# Patient Record
Sex: FEMALE | Race: WHITE | HISPANIC OR LATINO | Employment: OTHER | URBAN - METROPOLITAN AREA
[De-identification: names, ages, dates, MRNs, and addresses within clinical notes are randomized per-mention and may not be internally consistent; named-entity substitution may affect disease eponyms.]

---

## 2017-09-03 ENCOUNTER — HOSPITAL ENCOUNTER (EMERGENCY)
Facility: HOSPITAL | Age: 37
Discharge: HOME | End: 2017-09-03
Attending: EMERGENCY MEDICINE | Admitting: EMERGENCY MEDICINE

## 2017-09-03 VITALS
OXYGEN SATURATION: 100 % | HEIGHT: 64 IN | BODY MASS INDEX: 39.27 KG/M2 | SYSTOLIC BLOOD PRESSURE: 132 MMHG | TEMPERATURE: 97.7 F | RESPIRATION RATE: 18 BRPM | WEIGHT: 230 LBS | DIASTOLIC BLOOD PRESSURE: 76 MMHG | HEART RATE: 77 BPM

## 2017-09-03 DIAGNOSIS — T23.121A: Primary | ICD-10-CM

## 2017-09-03 DIAGNOSIS — T23.221A: ICD-10-CM

## 2017-09-03 PROCEDURE — 99283 EMERGENCY DEPT VISIT LOW MDM: CPT

## 2017-09-03 RX ORDER — BACITRACIN, NEOMYCIN, POLYMYXIN B 400; 3.5; 5 [USP'U]/G; MG/G; [USP'U]/G
1 OINTMENT TOPICAL ONCE
Status: COMPLETED | OUTPATIENT
Start: 2017-09-03 | End: 2017-09-03

## 2017-09-03 RX ADMIN — BACITRACIN, NEOMYCIN, POLYMYXIN B 1 SMALL APPLICATION: 400; 3.5; 5 OINTMENT TOPICAL at 00:54

## 2020-07-04 ENCOUNTER — HOSPITAL ENCOUNTER (EMERGENCY)
Facility: HOSPITAL | Age: 40
Discharge: HOME | End: 2020-07-04
Attending: EMERGENCY MEDICINE

## 2020-07-04 VITALS
BODY MASS INDEX: 44.65 KG/M2 | OXYGEN SATURATION: 99 % | HEART RATE: 93 BPM | SYSTOLIC BLOOD PRESSURE: 130 MMHG | WEIGHT: 260.14 LBS | RESPIRATION RATE: 19 BRPM | DIASTOLIC BLOOD PRESSURE: 69 MMHG | TEMPERATURE: 98.2 F

## 2020-07-04 DIAGNOSIS — N81.4 CERVICAL PROLAPSE: Primary | ICD-10-CM

## 2020-07-04 DIAGNOSIS — D50.9 MICROCYTIC ANEMIA: ICD-10-CM

## 2020-07-04 LAB
ANION GAP SERPL CALCULATED.3IONS-SCNC: 8 MMOL/L (ref 4–13)
BACTERIA UR QL AUTO: ABNORMAL /HPF
BASOPHILS # BLD AUTO: 0.02 THOUSANDS/ΜL (ref 0–0.1)
BASOPHILS NFR BLD AUTO: 0 % (ref 0–1)
BILIRUB UR QL STRIP: NEGATIVE
BUN SERPL-MCNC: 11 MG/DL (ref 5–25)
CALCIUM SERPL-MCNC: 8.7 MG/DL (ref 8.3–10.1)
CHLORIDE SERPL-SCNC: 104 MMOL/L (ref 100–108)
CLARITY UR: CLEAR
CO2 SERPL-SCNC: 28 MMOL/L (ref 21–32)
COLOR UR: YELLOW
CREAT SERPL-MCNC: 0.69 MG/DL (ref 0.6–1.3)
EOSINOPHIL # BLD AUTO: 0.16 THOUSAND/ΜL (ref 0–0.61)
EOSINOPHIL NFR BLD AUTO: 3 % (ref 0–6)
ERYTHROCYTE [DISTWIDTH] IN BLOOD BY AUTOMATED COUNT: 20.3 % (ref 11.6–15.1)
EXT PREG TEST URINE: NEGATIVE
EXT. CONTROL ED NAV: NORMAL
GFR SERPL CREATININE-BSD FRML MDRD: 108 ML/MIN/1.73SQ M
GLUCOSE SERPL-MCNC: 85 MG/DL (ref 65–140)
GLUCOSE UR STRIP-MCNC: NEGATIVE MG/DL
HCT VFR BLD AUTO: 36.6 % (ref 34.8–46.1)
HGB BLD-MCNC: 10.5 G/DL (ref 11.5–15.4)
HGB UR QL STRIP.AUTO: ABNORMAL
IMM GRANULOCYTES # BLD AUTO: 0.02 THOUSAND/UL (ref 0–0.2)
IMM GRANULOCYTES NFR BLD AUTO: 0 % (ref 0–2)
KETONES UR STRIP-MCNC: NEGATIVE MG/DL
LEUKOCYTE ESTERASE UR QL STRIP: NEGATIVE
LYMPHOCYTES # BLD AUTO: 1.84 THOUSANDS/ΜL (ref 0.6–4.47)
LYMPHOCYTES NFR BLD AUTO: 28 % (ref 14–44)
MCH RBC QN AUTO: 20.5 PG (ref 26.8–34.3)
MCHC RBC AUTO-ENTMCNC: 28.7 G/DL (ref 31.4–37.4)
MCV RBC AUTO: 71 FL (ref 82–98)
MONOCYTES # BLD AUTO: 0.42 THOUSAND/ΜL (ref 0.17–1.22)
MONOCYTES NFR BLD AUTO: 7 % (ref 4–12)
MUCOUS THREADS UR QL AUTO: ABNORMAL
NEUTROPHILS # BLD AUTO: 4.03 THOUSANDS/ΜL (ref 1.85–7.62)
NEUTS SEG NFR BLD AUTO: 62 % (ref 43–75)
NITRITE UR QL STRIP: NEGATIVE
NON-SQ EPI CELLS URNS QL MICRO: ABNORMAL /HPF
NRBC BLD AUTO-RTO: 0 /100 WBCS
PH UR STRIP.AUTO: 6 [PH]
PLATELET # BLD AUTO: 301 THOUSANDS/UL (ref 149–390)
PMV BLD AUTO: 10 FL (ref 8.9–12.7)
POTASSIUM SERPL-SCNC: 3.7 MMOL/L (ref 3.5–5.3)
PROT UR STRIP-MCNC: NEGATIVE MG/DL
RBC # BLD AUTO: 5.13 MILLION/UL (ref 3.81–5.12)
RBC #/AREA URNS AUTO: ABNORMAL /HPF
SODIUM SERPL-SCNC: 140 MMOL/L (ref 136–145)
SP GR UR STRIP.AUTO: >=1.03 (ref 1–1.03)
TSH SERPL DL<=0.05 MIU/L-ACNC: 1.31 UIU/ML (ref 0.36–3.74)
UROBILINOGEN UR QL STRIP.AUTO: 0.2 E.U./DL
WBC # BLD AUTO: 6.49 THOUSAND/UL (ref 4.31–10.16)
WBC #/AREA URNS AUTO: ABNORMAL /HPF

## 2020-07-04 PROCEDURE — 99283 EMERGENCY DEPT VISIT LOW MDM: CPT | Performed by: EMERGENCY MEDICINE

## 2020-07-04 PROCEDURE — 36415 COLL VENOUS BLD VENIPUNCTURE: CPT | Performed by: EMERGENCY MEDICINE

## 2020-07-04 PROCEDURE — 99284 EMERGENCY DEPT VISIT MOD MDM: CPT

## 2020-07-04 PROCEDURE — 81001 URINALYSIS AUTO W/SCOPE: CPT | Performed by: EMERGENCY MEDICINE

## 2020-07-04 PROCEDURE — 80048 BASIC METABOLIC PNL TOTAL CA: CPT | Performed by: EMERGENCY MEDICINE

## 2020-07-04 PROCEDURE — 84443 ASSAY THYROID STIM HORMONE: CPT | Performed by: EMERGENCY MEDICINE

## 2020-07-04 PROCEDURE — 81025 URINE PREGNANCY TEST: CPT | Performed by: EMERGENCY MEDICINE

## 2020-07-04 PROCEDURE — 85025 COMPLETE CBC W/AUTO DIFF WBC: CPT | Performed by: EMERGENCY MEDICINE

## 2020-07-04 NOTE — ED PROVIDER NOTES
History  Chief Complaint   Patient presents with    Vaginal Prolapse     pt co of a "bulge in her vigina, i noticed it when i was trying to put in the cup for my period"      HPI     39-year-old previously healthy female,  with 2 teenaged children born via spontaneous vaginal delivery, who presents for evaluation of a bulge in her vaginal area  Patient states that she has always had regular periods, but over the last year her periods have become lighter and more irregular  She had some light bleeding a week ago for 2 days, the bleeding stopped, and then recurred yesterday  She uses a menstrual cup, but noticed a bulge in her vaginal area that made it difficult to insert the cup yesterday  She denies any prior history of this  Does endorse occasional stress incontinence with sneezing, but no other change in bowel or bladder habits  No dysuria or frequency  She has not seen an OBGYN or had routine Pap smears in 14 years since the birth of her son  No history of OB gyn pathology  No vaginal discharge  Denies painful intercourse  She is not currently on any birth control  None       History reviewed  No pertinent past medical history  History reviewed  No pertinent surgical history  History reviewed  No pertinent family history  I have reviewed and agree with the history as documented  E-Cigarette/Vaping    E-Cigarette Use Never User      E-Cigarette/Vaping Substances     Social History     Tobacco Use    Smoking status: Never Smoker    Smokeless tobacco: Never Used   Substance Use Topics    Alcohol use: No    Drug use: No       Review of Systems   Constitutional: Negative for chills and fever  HENT: Negative for congestion  Eyes: Negative for visual disturbance  Respiratory: Negative for cough and shortness of breath  Cardiovascular: Negative for chest pain and leg swelling  Gastrointestinal: Negative for abdominal pain, diarrhea, nausea and vomiting     Genitourinary: Positive for menstrual problem (light, irregular menses)  Negative for dysuria, flank pain, frequency, pelvic pain, vaginal bleeding, vaginal discharge and vaginal pain         "bulge" in vaginal area   Musculoskeletal: Negative for arthralgias, back pain, neck pain and neck stiffness  Skin: Negative for rash  Neurological: Negative for weakness, numbness and headaches  Psychiatric/Behavioral: Negative for agitation, behavioral problems and confusion  Physical Exam  Physical Exam   Constitutional: She is oriented to person, place, and time  She appears well-developed and well-nourished  No distress  HENT:   Head: Normocephalic and atraumatic  Right Ear: External ear normal    Left Ear: External ear normal    Nose: Nose normal    Mouth/Throat: Oropharynx is clear and moist    Eyes: Conjunctivae are normal    Neck: Normal range of motion  Neck supple  Cardiovascular: Normal rate, regular rhythm and normal heart sounds  Exam reveals no gallop and no friction rub  No murmur heard  Pulmonary/Chest: Effort normal and breath sounds normal  No respiratory distress  She has no wheezes  She has no rales  Abdominal: Soft  Bowel sounds are normal  She exhibits no distension  There is no tenderness  There is no guarding  Genitourinary:   Genitourinary Comments: Normal appearance to the vaginal vault and external genitalia  Scant dark red blood within the vaginal vault  Cervix is normal in appearance on visual inspection with pelvic exam, however when patient is asked to bear down the cervix is noted to prolapse  No cervical motion tenderness, uterine tenderness, or adnexal tenderness  Uterus feels normal in size and position  Ovaries nonpalpable  Musculoskeletal: Normal range of motion  She exhibits no edema or deformity  Neurological: She is alert and oriented to person, place, and time  She exhibits normal muscle tone  Skin: Skin is warm and dry  She is not diaphoretic         Vital Signs  ED Triage Vitals [07/04/20 1022]   Temperature Pulse Respirations Blood Pressure SpO2   98 2 °F (36 8 °C) 93 19 130/69 99 %      Temp Source Heart Rate Source Patient Position - Orthostatic VS BP Location FiO2 (%)   Oral Monitor Sitting Right arm --      Pain Score       --           Vitals:    07/04/20 1022   BP: 130/69   Pulse: 93   Patient Position - Orthostatic VS: Sitting         Visual Acuity      ED Medications  Medications - No data to display    Diagnostic Studies  Results Reviewed     Procedure Component Value Units Date/Time    Basic metabolic panel [[id number]] Collected:  07/04/20 1050    Lab Status:  Final result Specimen:  Blood from Arm, Right Updated:  07/04/20 1125     Sodium 140 mmol/L      Potassium 3 7 mmol/L      Chloride 104 mmol/L      CO2 28 mmol/L      ANION GAP 8 mmol/L      BUN 11 mg/dL      Creatinine 0 69 mg/dL      Glucose 85 mg/dL      Calcium 8 7 mg/dL      eGFR 108 ml/min/1 73sq m     Narrative:       [place] guidelines for Chronic Kidney Disease (CKD):     Stage 1 with normal or high GFR (GFR > 90 mL/min/1 73 square meters)    Stage 2 Mild CKD (GFR = 60-89 mL/min/1 73 square meters)    Stage 3A Moderate CKD (GFR = 45-59 mL/min/1 73 square meters)    Stage 3B Moderate CKD (GFR = 30-44 mL/min/1 73 square meters)    Stage 4 Severe CKD (GFR = 15-29 mL/min/1 73 square meters)    Stage 5 End Stage CKD (GFR <15 mL/min/1 73 square meters)  Note: GFR calculation is accurate only with a steady state creatinine    TSH [[id number]]  (Normal) Collected:  07/04/20 1050    Lab Status:  Final result Specimen:  Blood from Arm, Right Updated:  07/04/20 1125     TSH 3RD GENERATON 1 312 uIU/mL     Narrative:       Patients undergoing fluorescein dye angiography may retain small amounts of fluorescein in the body for 48-72 hours post procedure  Samples containing fluorescein can produce falsely depressed TSH values   If the patient had this procedure,a specimen should be resubmitted post fluorescein clearance        Urine Microscopic [[id number]]  (Abnormal) Collected:  07/04/20 1050    Lab Status:  Final result Specimen:  Urine, Clean Catch Updated:  07/04/20 1118     RBC, UA 10-20 /hpf      WBC, UA None Seen /hpf      Epithelial Cells Occasional /hpf      Bacteria, UA None Seen /hpf      MUCUS THREADS Moderate    UA w Reflex to Microscopic w Reflex to Culture [[id number]]  (Abnormal) Collected:  07/04/20 1050    Lab Status:  Final result Specimen:  Urine, Clean Catch Updated:  07/04/20 1106     Color, UA Yellow     Clarity, UA Clear     Specific Gravity, UA >=1 030     pH, UA 6 0     Leukocytes, UA Negative     Nitrite, UA Negative     Protein, UA Negative mg/dl      Glucose, UA Negative mg/dl      Ketones, UA Negative mg/dl      Urobilinogen, UA 0 2 E U /dl      Bilirubin, UA Negative     Blood, UA Large    POCT pregnancy, urine [[id number]]  (Normal) Resulted:  07/04/20 1101    Lab Status:  Final result Updated:  07/04/20 1101     EXT PREG TEST UR (Ref: Negative) negative     Control valid    CBC and differential [[id number]]  (Abnormal) Collected:  07/04/20 1049    Lab Status:  Final result Specimen:  Blood from Arm, Right Updated:  07/04/20 1100     WBC 6 49 Thousand/uL      RBC 5 13 Million/uL      Hemoglobin 10 5 g/dL      Hematocrit 36 6 %      MCV 71 fL      MCH 20 5 pg      MCHC 28 7 g/dL      RDW 20 3 %      MPV 10 0 fL      Platelets 646 Thousands/uL      nRBC 0 /100 WBCs      Neutrophils Relative 62 %      Immat GRANS % 0 %      Lymphocytes Relative 28 %      Monocytes Relative 7 %      Eosinophils Relative 3 %      Basophils Relative 0 %      Neutrophils Absolute 4 03 Thousands/µL      Immature Grans Absolute 0 02 Thousand/uL      Lymphocytes Absolute 1 84 Thousands/µL      Monocytes Absolute 0 42 Thousand/µL      Eosinophils Absolute 0 16 Thousand/µL      Basophils Absolute 0 02 Thousands/µL                  No orders to display              Procedures  Procedures         ED Course                                             MDM  Number of Diagnoses or Management Options  Cervical prolapse: new and requires workup  Microcytic anemia: new and requires workup  Diagnosis management comments: Generally well appearing  Afebrile and hemodynamically stable  Pelvic exam as above is indicative of cervical prolapse  No associated pain, masses, or abscesses  Blood work unremarkable  Recommend follow-up with OBGYN to discuss options, likely to include pessary placement  Patient counseled that she can gently reduce her cervix in the event of prolapse  Patient also found to have microcytic anemia  Reports a history of this  She does not currently have a PCP so was prescribed with the phone number for one  Return precautions discussed, and patient discharged in good condition  Amount and/or Complexity of Data Reviewed  Clinical lab tests: ordered and reviewed    Patient Progress  Patient progress: stable        Disposition  Final diagnoses:   Cervical prolapse   Microcytic anemia     Time reflects when diagnosis was documented in both MDM as applicable and the Disposition within this note     Time User Action Codes Description Comment    7/4/2020 11:43 AM Palmaetta Linear Add [N81 4] Cervical prolapse     7/4/2020 11:46 AM Palmaetta Linear Add [D50 9] Microcytic anemia       ED Disposition     ED Disposition Condition Date/Time Comment    Discharge Stable Sat Jul 4, 2020 11:41 AM Erick Liu discharge to home/self care  Follow-up Information     Follow up With Specialties Details Why Contact Info Additional [place]  [address]  Obstetrics and Gynecology Call in 2 days Please call on Monday to establish care with an Obgyn   Jesika MCNULTY [address], C/BEHZAD Dong, [address] [place] [address]   [phone]    Edwina Griffith MD Obstetrics and Gynecology, Obstetrics, Gynecology In 2 days Please call Monday to establish care with an Obgyn  [facility] Emergency Medicine  As we discussed, return to the Emergency Department for vaginal pain with heavy bleeding  [address] [facility] [address]  [address] [place] [address], [address]    Pramod Lang MD Internal Medicine Call in 2 days Please call to schedule an appointment with a primary care doctor for further management of your anemia  [address] [facility] [address] [phone]             There are no discharge medications for this patient  No discharge procedures on file      PDMP Review     None          ED Provider  Electronically Signed by           Leyla Mendosa MD  07/04/20 4748

## 2020-07-16 ENCOUNTER — PATIENT OUTREACH (OUTPATIENT)
Dept: OBGYN CLINIC | Facility: CLINIC | Age: 40
End: 2020-07-16

## 2020-07-16 ENCOUNTER — OFFICE VISIT (OUTPATIENT)
Dept: OBGYN CLINIC | Facility: CLINIC | Age: 40
End: 2020-07-16

## 2020-07-16 ENCOUNTER — TELEPHONE (OUTPATIENT)
Dept: OBGYN CLINIC | Facility: CLINIC | Age: 40
End: 2020-07-16

## 2020-07-16 VITALS
SYSTOLIC BLOOD PRESSURE: 118 MMHG | BODY MASS INDEX: 43.96 KG/M2 | WEIGHT: 256.1 LBS | RESPIRATION RATE: 16 BRPM | DIASTOLIC BLOOD PRESSURE: 82 MMHG | HEART RATE: 94 BPM | TEMPERATURE: 98 F

## 2020-07-16 DIAGNOSIS — N81.4 UTERINE PROLAPSE: Primary | ICD-10-CM

## 2020-07-16 PROCEDURE — 99203 OFFICE O/P NEW LOW 30 MIN: CPT | Performed by: OBSTETRICS & GYNECOLOGY

## 2020-07-16 NOTE — PROGRESS NOTES
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

## 2020-07-17 NOTE — PROGRESS NOTES
OB/GYN VISIT  Serg Vazquez  2020  9:03 AM    Subjective:     Serg Vazquez is a 38 y o  P2 female who presents for ED follow up  She was seen in the ED on 20 and was diagnosed with vaginal prolapse  She was encouraged to follow up with [facility] for pessary placement  Patient is a healthy P2 ( x 2) sexually active female  She only recently noticed a bulge upon placing a menstrual cup, which she started doing last month to measure menstrual bleeding  Denies history of bulge  Reports occasional stress incontinence with sneezing and laughing but denies urge incontinence  Has not noticed constipation, diarrhea, dysuria, hematuria  Denies dyspareunia, dysmenorrhea  Patient reports history of regular periods  Last year, she had an episode of intermenstrual spotting but it subsequently resolved with next menses  Since then, she has had regular menses up into July  Patient reports last menses -  Starting , she has noticed daily spotting, sometimes filling up a pad daily  She is not currently on contraception  Obstetric history significant for 2   Reports pushing a long time with both deliveries, 8 5lb neonates  Objective:    Vitals: Blood pressure 118/82, pulse 94, temperature 98 °F (36 7 °C), resp  rate 16, weight 116 kg (256 lb 1 6 oz), last menstrual period 2020  Body mass index is 43 96 kg/m²  Physical Exam   Constitutional: She is oriented to person, place, and time  She appears well-developed and well-nourished  No distress  Pulmonary/Chest: Effort normal    Abdominal: Soft  She exhibits no distension  There is no tenderness  There is no rebound and no guarding  Genitourinary: Vagina normal and uterus normal    Genitourinary Comments: The external female genitalia is normal  The Bartholin and Skenes glands are normal  The urethral meatus is normal and midline  Speculum exam reveals vagina without lesion or discharge   Cervix is normal appearing without visible lesions  No bleeding  Stage I apical wall prolapse visualized on speculum exam and on bimanual exam  No cystocele or rectocele appreciated  Neurological: She is alert and oriented to person, place, and time  No cranial nerve deficit  Coordination normal    Skin: She is not diaphoretic  Psychiatric: She has a normal mood and affect  Her behavior is normal  Judgment and thought content normal    Vitals reviewed  Assessment/Plan:    Stage 1 apical wall prolapse  Currently asymptomatic  Since she is sexually active (without issue), is able to complete activities of daily living without noticing apparent vaginal bulge, and her apical wall prolapse is only stage I, discussed expectant management vs pessary placement  Patient declines pessary at this time  I discussed kegel exercises, adequate hydration, avoiding bladder irritants  Irregular menstrual bleeding this month  Patient is not currently on contraception and just recently noticed irregular bleeding after last menses  I discussed short acting contraception and LARCs with her  She would like Mirena IUD and will return to clinic for placement  Dr Nellie Salazar in room for examination and evaluation         Magda Franz MD  7/17/2020  9:03 AM

## 2021-04-15 ENCOUNTER — IMMUNIZATIONS (OUTPATIENT)
Dept: FAMILY MEDICINE CLINIC | Facility: HOSPITAL | Age: 41
End: 2021-04-15

## 2021-04-15 DIAGNOSIS — Z23 ENCOUNTER FOR IMMUNIZATION: Primary | ICD-10-CM

## 2021-04-15 PROCEDURE — 91300 SARS-COV-2 / COVID-19 MRNA VACCINE (PFIZER-BIONTECH) 30 MCG: CPT

## 2021-04-15 PROCEDURE — 0001A SARS-COV-2 / COVID-19 MRNA VACCINE (PFIZER-BIONTECH) 30 MCG: CPT

## 2021-05-07 ENCOUNTER — IMMUNIZATIONS (OUTPATIENT)
Dept: FAMILY MEDICINE CLINIC | Facility: HOSPITAL | Age: 41
End: 2021-05-07

## 2021-05-07 DIAGNOSIS — Z23 ENCOUNTER FOR IMMUNIZATION: Primary | ICD-10-CM

## 2021-05-07 PROCEDURE — 0002A SARS-COV-2 / COVID-19 MRNA VACCINE (PFIZER-BIONTECH) 30 MCG: CPT

## 2021-05-07 PROCEDURE — 91300 SARS-COV-2 / COVID-19 MRNA VACCINE (PFIZER-BIONTECH) 30 MCG: CPT

## 2023-09-19 ENCOUNTER — APPOINTMENT (EMERGENCY)
Dept: CT IMAGING | Facility: HOSPITAL | Age: 43
End: 2023-09-19

## 2023-09-19 ENCOUNTER — HOSPITAL ENCOUNTER (EMERGENCY)
Facility: HOSPITAL | Age: 43
Discharge: HOME | End: 2023-09-19
Attending: EMERGENCY MEDICINE

## 2023-09-19 VITALS
OXYGEN SATURATION: 99 % | RESPIRATION RATE: 18 BRPM | SYSTOLIC BLOOD PRESSURE: 165 MMHG | HEART RATE: 82 BPM | TEMPERATURE: 98.2 F | DIASTOLIC BLOOD PRESSURE: 79 MMHG

## 2023-09-19 DIAGNOSIS — M54.41 ACUTE MIDLINE LOW BACK PAIN WITH BILATERAL SCIATICA: Primary | ICD-10-CM

## 2023-09-19 DIAGNOSIS — M54.42 ACUTE MIDLINE LOW BACK PAIN WITH BILATERAL SCIATICA: Primary | ICD-10-CM

## 2023-09-19 LAB
EXT PREGNANCY TEST URINE: NEGATIVE
EXT. CONTROL: NORMAL

## 2023-09-19 PROCEDURE — 99283 EMERGENCY DEPT VISIT LOW MDM: CPT

## 2023-09-19 PROCEDURE — 81025 URINE PREGNANCY TEST: CPT | Performed by: EMERGENCY MEDICINE

## 2023-09-19 PROCEDURE — 96374 THER/PROPH/DIAG INJ IV PUSH: CPT

## 2023-09-19 PROCEDURE — 99284 EMERGENCY DEPT VISIT MOD MDM: CPT | Performed by: EMERGENCY MEDICINE

## 2023-09-19 PROCEDURE — 72131 CT LUMBAR SPINE W/O DYE: CPT

## 2023-09-19 RX ORDER — ACETAMINOPHEN 325 MG/1
975 TABLET ORAL ONCE
Status: COMPLETED | OUTPATIENT
Start: 2023-09-19 | End: 2023-09-19

## 2023-09-19 RX ORDER — METHOCARBAMOL 500 MG/1
1000 TABLET, FILM COATED ORAL ONCE
Status: COMPLETED | OUTPATIENT
Start: 2023-09-19 | End: 2023-09-19

## 2023-09-19 RX ORDER — METHOCARBAMOL 500 MG/1
1000 TABLET, FILM COATED ORAL 3 TIMES DAILY PRN
Qty: 30 TABLET | Refills: 0 | Status: SHIPPED | OUTPATIENT
Start: 2023-09-19

## 2023-09-19 RX ORDER — PREDNISONE 20 MG/1
60 TABLET ORAL DAILY
Qty: 12 TABLET | Refills: 0 | Status: SHIPPED | OUTPATIENT
Start: 2023-09-19 | End: 2023-09-23

## 2023-09-19 RX ORDER — KETOROLAC TROMETHAMINE 30 MG/ML
30 INJECTION, SOLUTION INTRAMUSCULAR; INTRAVENOUS ONCE
Status: COMPLETED | OUTPATIENT
Start: 2023-09-19 | End: 2023-09-19

## 2023-09-19 RX ADMIN — ACETAMINOPHEN 975 MG: 325 TABLET, FILM COATED ORAL at 10:39

## 2023-09-19 RX ADMIN — METHOCARBAMOL 1000 MG: 500 TABLET ORAL at 10:39

## 2023-09-19 RX ADMIN — KETOROLAC TROMETHAMINE 30 MG: 30 INJECTION, SOLUTION INTRAMUSCULAR at 10:52

## 2023-09-19 NOTE — ED PROVIDER NOTES
History  Chief Complaint   Patient presents with   • Back Pain     Pt presents with c/o lower back pain x1 week, reports turning over in bed this morning and felt a "popping pressure" and states the pain is radiating down both legs. 48-year-old female no significant reported past history presenting with low back pain. Patient reports he is onset of low back pain beginning a couple days ago. She reports that she exercised and had some worsening but rested yesterday with overall improvement. She reports that she turned in bed this morning and had acute onset of worsening low back pain now with new radiation down bilateral lateral buttock and thighs. Does not go past knees. Denies any neurological changes such as motor or sensory deficits. Denies any bladder or bowel incontinence or retention. Denies abdominal pain nausea vomiting diarrhea. Also denies any fevers, back surgeries, IV drug use, chronic immunosuppression, chronic steroid use. Denies trauma. Denies any other complaints. Chart reviewed. History reviewed. No pertinent past medical history. Family History: non-contributory  Social History            None       History reviewed. No pertinent past medical history. History reviewed. No pertinent surgical history. History reviewed. No pertinent family history. I have reviewed and agree with the history as documented. E-Cigarette/Vaping   • E-Cigarette Use Never User      E-Cigarette/Vaping Substances     Social History     Tobacco Use   • Smoking status: Never   • Smokeless tobacco: Never   Vaping Use   • Vaping Use: Never used   Substance Use Topics   • Alcohol use: No   • Drug use: No       Review of Systems   Constitutional: Negative for appetite change, chills, diaphoresis, fever and unexpected weight change. HENT: Negative for congestion and rhinorrhea. Eyes: Negative for photophobia and visual disturbance.    Respiratory: Negative for cough, chest tightness and shortness of breath. Cardiovascular: Negative for chest pain, palpitations and leg swelling. Gastrointestinal: Negative for abdominal distention, abdominal pain, blood in stool, constipation, diarrhea, nausea and vomiting. Genitourinary: Negative for dysuria and hematuria. Musculoskeletal: Positive for back pain. Negative for joint swelling, neck pain and neck stiffness. Skin: Negative for color change, pallor, rash and wound. Neurological: Negative for dizziness, syncope, weakness, light-headedness and headaches. Psychiatric/Behavioral: Negative for agitation. All other systems reviewed and are negative. Physical Exam  Physical Exam  Vitals and nursing note reviewed. Constitutional:       General: She is not in acute distress. Appearance: Normal appearance. She is well-developed. She is not ill-appearing, toxic-appearing or diaphoretic. HENT:      Head: Normocephalic and atraumatic. Nose: Nose normal. No congestion or rhinorrhea. Mouth/Throat:      Mouth: Mucous membranes are moist.      Pharynx: Oropharynx is clear. No oropharyngeal exudate or posterior oropharyngeal erythema. Eyes:      General: No scleral icterus. Right eye: No discharge. Left eye: No discharge. Extraocular Movements: Extraocular movements intact. Conjunctiva/sclera: Conjunctivae normal.      Pupils: Pupils are equal, round, and reactive to light. Neck:      Vascular: No JVD. Trachea: No tracheal deviation. Comments: Supple. Normal range of motion. Cardiovascular:      Rate and Rhythm: Normal rate and regular rhythm. Heart sounds: Normal heart sounds. No murmur heard. No friction rub. No gallop. Comments: Normal rate and regular rhythm  Pulmonary:      Effort: Pulmonary effort is normal. No respiratory distress. Breath sounds: Normal breath sounds. No stridor. No wheezing or rales.       Comments: Clear to auscultation bilaterally  Chest:      Chest wall: No tenderness. Abdominal:      General: Bowel sounds are normal. There is no distension. Palpations: Abdomen is soft. Tenderness: There is no abdominal tenderness. There is no right CVA tenderness, left CVA tenderness, guarding or rebound. Comments: Soft, nontender, nondistended. Normal bowel sounds throughout   Musculoskeletal:         General: Tenderness present. No swelling, deformity or signs of injury. Normal range of motion. Cervical back: Normal range of motion and neck supple. No rigidity. No muscular tenderness. Right lower leg: No edema. Left lower leg: No edema. Comments: Mid lumbar and bilateral paraspinal muscular tenderness. Sensation intact entirety bilateral lower extremities. Motor 5 out of 5.  2+ DP PT pulses bilaterally   Lymphadenopathy:      Cervical: No cervical adenopathy. Skin:     General: Skin is warm and dry. Coloration: Skin is not pale. Findings: No erythema or rash. Neurological:      General: No focal deficit present. Mental Status: She is alert. Mental status is at baseline. Sensory: No sensory deficit. Motor: No weakness or abnormal muscle tone. Coordination: Coordination normal.      Gait: Gait normal.      Comments: Alert. Strength and sensation grossly intact. Ambulatory without difficulty at baseline. Psychiatric:         Behavior: Behavior normal.         Thought Content:  Thought content normal.         Vital Signs  ED Triage Vitals [09/19/23 0958]   Temperature Pulse Respirations Blood Pressure SpO2   98.2 °F (36.8 °C) 78 18 169/82 100 %      Temp Source Heart Rate Source Patient Position - Orthostatic VS BP Location FiO2 (%)   Temporal Monitor Sitting Left arm --      Pain Score       10 - Worst Possible Pain           Vitals:    09/19/23 0958   BP: 169/82   Pulse: 78   Patient Position - Orthostatic VS: Sitting         Visual Acuity  Visual Acuity    Flowsheet Row Most Recent Value   L Pupil Size (mm) 3   R Pupil Size (mm) 3          ED Medications  Medications   acetaminophen (TYLENOL) tablet 975 mg (975 mg Oral Given 9/19/23 1039)   ketorolac (TORADOL) injection 30 mg (30 mg Intravenous Given 9/19/23 1052)   methocarbamol (ROBAXIN) tablet 1,000 mg (1,000 mg Oral Given 9/19/23 1039)       Diagnostic Studies  Results Reviewed     Procedure Component Value Units Date/Time    POCT pregnancy, urine [[id number]]  (Normal) Resulted: 09/19/23 1038    Lab Status: Final result Updated: 09/19/23 1038     EXT Preg Test, Ur Negative     Control Valid                 CT lumbar spine without contrast   Final Result by Ella Duke MD (09/19 1249)      No acute osseous pathology. Disc bulges and herniations at L3-L4 and L5-S1. Stenosis as above. Workstation performed: [id number]                    Procedures  Procedures         ED Course                               SBIRT 22yo+    Flowsheet Row Most Recent Value   Initial Alcohol Screen: US AUDIT-C     1. How often do you have a drink containing alcohol? 0 Filed at: 09/19/2023 1032   2. How many drinks containing alcohol do you have on a typical day you are drinking? 0 Filed at: 09/19/2023 1032   3b. FEMALE Any Age, or MALE 65+: How often do you have 4 or more drinks on one occassion? 0 Filed at: 09/19/2023 1032   Audit-C Score 0 Filed at: 09/19/2023 1032   WANDA: How many times in the past year have you. .. Used an illegal drug or used a prescription medication for non-medical reasons? Never Filed at: 09/19/2023 1032                    Medical Decision Making  41-year-old female no significant reported past history presenting with low back pain. Acute worsening back pain with turning now worse with twisting turning with normal neuro exam.  Midline tenderness. Plan for lumbar imaging. Symptom management with oral and IM medications. Reassess. Imaging no significant acute process. Symptoms improving with medications.  Discussed results and recommendations. Advised follow up PCP and comprehensive spine. Medication recommendations. Given instructions and return precautions. Patient/family at bedside acknowledged understanding of all written and verbal instructions and return precautions. Discharged. Amount and/or Complexity of Data Reviewed  Labs: ordered. Radiology: ordered. Risk  OTC drugs. Prescription drug management. Disposition  Final diagnoses:   Acute midline low back pain with bilateral sciatica     Time reflects when diagnosis was documented in both MDM as applicable and the Disposition within this note     Time User Action Codes Description Comment    9/19/2023 10:18 AM Jennifer Mail Add [D52.71,  M54.41] Acute midline low back pain with bilateral sciatica       ED Disposition     ED Disposition   Discharge    Condition   Stable    Date/Time   Tue Sep 19, 2023 12:53 PM    [facility] discharge to home/self care. Follow-up Information     Follow up With Specialties Details Why Contact Info Additional Information    [employer]  Call  for [phone]       [facility] Physical Therapy Schedule an appointment as soon as possible for a visit in 3 days  [phone]          Patient's Medications   Discharge Prescriptions    METHOCARBAMOL (ROBAXIN) 500 MG TABLET    Take 2 tablets (1,000 mg total) by mouth 3 (three) times a day as needed for muscle spasms (back pain)       Start Date: 9/19/2023 End Date: --       Order Dose: 1,000 mg       Quantity: 30 tablet    Refills: 0       No discharge procedures on file.     PDMP Review     None          ED Provider  Electronically Signed by           Joycie Mcardle, MD  09/19/23 6484

## 2023-09-19 NOTE — DISCHARGE INSTRUCTIONS
Please follow up PCP and comprehensive spine Center. Recommend tylenol 650 mg and ibuprofen 600 mg every 6 hours as needed for pain. Please return for severe chest pain, significant shortness of breath, severely worsening symptoms, or any other concerning signs or symptoms. Please refer to the following documents for additional instructions and return precautions.

## 2025-02-07 ENCOUNTER — HOSPITAL ENCOUNTER (EMERGENCY)
Facility: HOSPITAL | Age: 45
Discharge: HOME | End: 2025-02-07
Attending: EMERGENCY MEDICINE

## 2025-02-07 ENCOUNTER — APPOINTMENT (EMERGENCY)
Dept: RADIOLOGY | Facility: HOSPITAL | Age: 45
End: 2025-02-07

## 2025-02-07 VITALS
WEIGHT: 293 LBS | BODY MASS INDEX: 51.49 KG/M2 | SYSTOLIC BLOOD PRESSURE: 136 MMHG | OXYGEN SATURATION: 98 % | HEART RATE: 92 BPM | TEMPERATURE: 97.7 F | RESPIRATION RATE: 18 BRPM | DIASTOLIC BLOOD PRESSURE: 85 MMHG

## 2025-02-07 DIAGNOSIS — M25.531 ACUTE PAIN OF RIGHT WRIST: Primary | ICD-10-CM

## 2025-02-07 PROCEDURE — 73090 X-RAY EXAM OF FOREARM: CPT

## 2025-02-07 PROCEDURE — 96372 THER/PROPH/DIAG INJ SC/IM: CPT

## 2025-02-07 PROCEDURE — 99284 EMERGENCY DEPT VISIT MOD MDM: CPT

## 2025-02-07 PROCEDURE — 73110 X-RAY EXAM OF WRIST: CPT

## 2025-02-07 PROCEDURE — 99283 EMERGENCY DEPT VISIT LOW MDM: CPT

## 2025-02-07 RX ORDER — KETOROLAC TROMETHAMINE 30 MG/ML
15 INJECTION, SOLUTION INTRAMUSCULAR; INTRAVENOUS ONCE
Status: COMPLETED | OUTPATIENT
Start: 2025-02-07 | End: 2025-02-07

## 2025-02-07 RX ORDER — PREDNISONE 20 MG/1
40 TABLET ORAL DAILY
Qty: 10 TABLET | Refills: 0 | Status: SHIPPED | OUTPATIENT
Start: 2025-02-07 | End: 2025-02-12

## 2025-02-07 RX ADMIN — KETOROLAC TROMETHAMINE 15 MG: 30 INJECTION, SOLUTION INTRAMUSCULAR; INTRAVENOUS at 16:56

## 2025-02-07 NOTE — DISCHARGE INSTRUCTIONS
You have been evaluated in the Emergency Department today for right wrist pain. Your evaluation, does not indicate anything requiring emergent intervention.     Please take prescribed steroids as directed. Continue Aleve every 6 hours for pain and inflammation. Rest, elevate, and ice your wrist.     Wear brace as needed for pain.     I have sent referrals for Ortho, Primary care, and Physical therapy. Please establish care with them.    Return to the Emergency Department if you experience fevers, chills, uncontrolled pain, or any other concerning symptoms.

## 2025-02-07 NOTE — ED PROVIDER NOTES
Time reflects when diagnosis was documented in both MDM as applicable and the Disposition within this note       Time User Action Codes Description Comment    2/7/2025  4:52 PM Erin Leonard Add [M77.8] Right wrist tendinitis     2/7/2025  4:56 PM Erin Leonard Remove [M77.8] Right wrist tendinitis     2/7/2025  4:56 PM Erin Leonard Add [M25.531] Acute pain of right wrist           ED Disposition       ED Disposition   Discharge    Condition   Stable    Date/Time   Fri Feb 7, 2025  4:52 PM    Comment   Paz Valentineo discharge to home/self care.                   Assessment & Plan       Medical Decision Making  44 yr old female presents with right wrist joint pain and swelling. Given history and exam, patient likely has arthritis vs carpal tunnel. I have low suspicion for fracture, dislocation, significant ligamentous injury, septic arthritis, gout flare, new autoimmune arthropathy, or gonococcal arthropathy. Patient afebrile and well appearing. Wrist is not tender or warm to touch and she has good ROM. Pulses are 2+ and sensation is intact bilaterally. Patient reports she has a hx of multiple joints swelling.     Plan   X-ray, Ketorolac  X ray with no evidence of fracture or acute pathology.     Discussed with patient negative x-ray findings. Discussed with patient symptoms are likely due to arthritis and over use of wrist. Discussed with patient she will need to follow up with a PCP or orthopedics. Sent referral for both as well as PT. Rx for steroids. Provided patient with brace. Encouraged rest, ice and elevation with Aleve pain control every 6 hours. Strict return to ED precautions discussed.     Prior to discharge, discharge instructions were discussed with patient at bedside. Patient was provided both verbal and written instructions. Patient is understanding of the discharge instructions and is agreeable to plan of care. Return precautions were discussed with patient bedside, patient  verbalized understanding of signs and symptoms that would necessitate return to the ED. All questions were answered. Patient was comfortable with the plan of care and discharged to home.      Amount and/or Complexity of Data Reviewed  Radiology: ordered.    Risk  Prescription drug management.             Medications   ketorolac (TORADOL) injection 15 mg (15 mg Intramuscular Given 2/7/25 1656)       ED Risk Strat Scores                          SBIRT 22yo+      Flowsheet Row Most Recent Value   Initial Alcohol Screen: US AUDIT-C     1. How often do you have a drink containing alcohol? 0 Filed at: 02/07/2025 1620   2. How many drinks containing alcohol do you have on a typical day you are drinking?  0 Filed at: 02/07/2025 1620   3a. Male UNDER 65: How often do you have five or more drinks on one occasion? 0 Filed at: 02/07/2025 1620   3b. FEMALE Any Age, or MALE 65+: How often do you have 4 or more drinks on one occassion? 0 Filed at: 02/07/2025 1620   Audit-C Score 0 Filed at: 02/07/2025 1620   WANDA: How many times in the past year have you...    Used an illegal drug or used a prescription medication for non-medical reasons? Never Filed at: 02/07/2025 1620                            History of Present Illness       Chief Complaint   Patient presents with    Joint Swelling     Pt c/o of right wrist pain and swelling. Pt has had pain in that wrist in the past because she is on the computer a lot, but it has gotten a lot worse the past couple of days.        History reviewed. No pertinent past medical history.   History reviewed. No pertinent surgical history.   History reviewed. No pertinent family history.   Social History     Tobacco Use    Smoking status: Never    Smokeless tobacco: Never   Vaping Use    Vaping status: Never Used   Substance Use Topics    Alcohol use: No    Drug use: No      E-Cigarette/Vaping    E-Cigarette Use Never User       E-Cigarette/Vaping Substances      I have reviewed and agree with the  history as documented.     44 yr old female presents with right wrist joint pain and swelling. Patient reports this been going on for some time now but reports over last 2 days pain and swelling has been getting worse. Reports she uses her right wrist a lot as this is her dominant hand and has been on computer more often than usual. Denies any numbness or tingling to the hand. Reports pain with opening and twisting thing. Patient reports pain also extends up forearm. Patient takes aleve for pain which helps. Denies any injuries or trauma to area. Denies any bites or open wounds. Denies fevers, chills, pain out of proportion, rashes, change in color, loss of sensation, numbness, or tingling.           Review of Systems   Constitutional:  Negative for chills and fever.   HENT:  Negative for ear pain and sore throat.    Eyes:  Negative for pain and visual disturbance.   Respiratory:  Negative for cough and shortness of breath.    Cardiovascular:  Negative for chest pain and palpitations.   Gastrointestinal:  Negative for abdominal pain and vomiting.   Genitourinary:  Negative for dysuria and hematuria.   Musculoskeletal:  Positive for arthralgias and joint swelling. Negative for back pain.        Right wrist pain and swelling   Skin:  Negative for color change and rash.   Neurological:  Negative for seizures and syncope.   All other systems reviewed and are negative.          Objective       ED Triage Vitals   Temperature Pulse Blood Pressure Respirations SpO2 Patient Position - Orthostatic VS   02/07/25 1543 02/07/25 1543 02/07/25 1543 02/07/25 1543 02/07/25 1543 --   97.7 °F (36.5 °C) 92 136/85 18 98 %       Temp Source Heart Rate Source BP Location FiO2 (%) Pain Score    02/07/25 1543 -- -- -- 02/07/25 1656    Temporal    4      Vitals      Date and Time Temp Pulse SpO2 Resp BP Pain Score FACES Pain Rating User   02/07/25 1656 -- -- -- -- -- 4 -- TS   02/07/25 1543 97.7 °F (36.5 °C) 92 98 % 18 136/85 -- -- EN             Physical Exam  Vitals and nursing note reviewed.   Constitutional:       General: She is not in acute distress.     Appearance: She is well-developed.   HENT:      Head: Normocephalic and atraumatic.   Eyes:      Conjunctiva/sclera: Conjunctivae normal.   Cardiovascular:      Rate and Rhythm: Normal rate and regular rhythm.      Heart sounds: No murmur heard.  Pulmonary:      Effort: Pulmonary effort is normal. No respiratory distress.      Breath sounds: Normal breath sounds.   Abdominal:      Palpations: Abdomen is soft.      Tenderness: There is no abdominal tenderness.   Musculoskeletal:         General: No swelling.      Right wrist: Swelling present. No deformity, effusion, lacerations, tenderness, bony tenderness or snuff box tenderness. Normal range of motion. Normal pulse.      Left wrist: Normal.      Right hand: Swelling present. No deformity or bony tenderness. Normal range of motion. Decreased strength of wrist extension. Normal sensation. There is no disruption of two-point discrimination. Normal capillary refill. Normal pulse.      Left hand: Normal.      Cervical back: Neck supple.      Comments: Negative tinel and phalens test.      Skin:     General: Skin is warm and dry.      Capillary Refill: Capillary refill takes less than 2 seconds.   Neurological:      Mental Status: She is alert.   Psychiatric:         Mood and Affect: Mood normal.         Results Reviewed       None            XR wrist 3+ views RIGHT   Final Interpretation by Gary Brian MD (02/07 1659)      No acute osseous abnormality.         Computerized Assisted Algorithm (CAA) may have been used to analyze all applicable images.               Workstation performed: [id number]         XR forearm 2 views RIGHT   Final Interpretation by Gary Brian MD (02/07 1657)      No acute osseous abnormality.         Computerized Assisted Algorithm (CAA) may have been used to analyze all applicable images.            Workstation  performed: [id number]             Procedures    ED Medication and Procedure Management   Prior to Admission Medications   Prescriptions Last Dose Informant Patient Reported? Taking?   methocarbamol (ROBAXIN) 500 mg tablet   No No   Sig: Take 2 tablets (1,000 mg total) by mouth 3 (three) times a day as needed for muscle spasms (back pain)      Facility-Administered Medications: None     Discharge Medication List as of 2/7/2025  4:56 PM        START taking these medications    Details   predniSONE 20 mg tablet Take 2 tablets (40 mg total) by mouth daily for 5 days, Starting Fri 2/7/2025, Until Wed 2/12/2025, Normal           CONTINUE these medications which have NOT CHANGED    Details   methocarbamol (ROBAXIN) 500 mg tablet Take 2 tablets (1,000 mg total) by mouth 3 (three) times a day as needed for muscle spasms (back pain), Starting Tue 9/19/2023, Normal             ED SEPSIS DOCUMENTATION   Time reflects when diagnosis was documented in both MDM as applicable and the Disposition within this note       Time User Action Codes Description Comment    2/7/2025  4:52 PM Erin Leonard Add [M77.8] Right wrist tendinitis     2/7/2025  4:56 PM Erin Leonard Remove [M77.8] Right wrist tendinitis     2/7/2025  4:56 PM Erin Leonard Add [M25.531] Acute pain of right wrist                  Erin Leonard PA-C  02/07/25 1744

## 2025-03-04 ENCOUNTER — OFFICE VISIT (OUTPATIENT)
Dept: FAMILY MEDICINE CLINIC | Facility: CLINIC | Age: 45
End: 2025-03-04

## 2025-03-04 VITALS
OXYGEN SATURATION: 98 % | HEART RATE: 84 BPM | BODY MASS INDEX: 50.02 KG/M2 | DIASTOLIC BLOOD PRESSURE: 78 MMHG | HEIGHT: 64 IN | WEIGHT: 293 LBS | SYSTOLIC BLOOD PRESSURE: 124 MMHG

## 2025-03-04 DIAGNOSIS — R06.81 APNEA: ICD-10-CM

## 2025-03-04 DIAGNOSIS — R06.83 SNORING: ICD-10-CM

## 2025-03-04 DIAGNOSIS — Z12.11 SCREEN FOR COLON CANCER: ICD-10-CM

## 2025-03-04 DIAGNOSIS — Z12.31 SCREENING MAMMOGRAM, ENCOUNTER FOR: ICD-10-CM

## 2025-03-04 DIAGNOSIS — M25.531 ACUTE PAIN OF RIGHT WRIST: ICD-10-CM

## 2025-03-04 DIAGNOSIS — R21 RASH: ICD-10-CM

## 2025-03-04 DIAGNOSIS — M25.50 ARTHRALGIA, UNSPECIFIED JOINT: ICD-10-CM

## 2025-03-04 DIAGNOSIS — R06.02 SHORT OF BREATH ON EXERTION: ICD-10-CM

## 2025-03-04 DIAGNOSIS — M25.511 ACUTE PAIN OF RIGHT SHOULDER: Primary | ICD-10-CM

## 2025-03-04 DIAGNOSIS — R00.2 PALPITATION: ICD-10-CM

## 2025-03-04 DIAGNOSIS — E66.01 MORBID OBESITY WITH BMI OF 50.0-59.9, ADULT (HCC): ICD-10-CM

## 2025-03-04 PROCEDURE — 99204 OFFICE O/P NEW MOD 45 MIN: CPT

## 2025-03-04 RX ORDER — TRIAMCINOLONE ACETONIDE 1 MG/G
CREAM TOPICAL
COMMUNITY
Start: 2025-02-03

## 2025-03-04 RX ORDER — HYDROXYZINE HYDROCHLORIDE 10 MG/1
10 TABLET, FILM COATED ORAL EVERY 6 HOURS PRN
Qty: 30 TABLET | Refills: 0 | Status: SHIPPED | OUTPATIENT
Start: 2025-03-04

## 2025-03-04 NOTE — PROGRESS NOTES
Name: Paz Younger      : 1980      MRN: [id number]  Encounter Provider: FAITH Tirado  Encounter Date: 3/4/2025   Encounter department: [facility] [address]  :  Assessment & Plan  Acute pain of right wrist  Continue brace will continue to manage with tylenol arthritis   Orders:  •  Ambulatory Referral to Family Practice    Acute pain of right shoulder  Xray and ortho follow up, pt has had pain for abour 2 months  Orders:  •  Ambulatory Referral to Orthopedic Surgery; Future  •  XR shoulder 2+ vw right; Future    Morbid obesity with BMI of 50.0-59.9, adult (HCC)  Myplate instructions given, great job with 5 lb weight loss. Have labs and follow up in 1 month    Orders:  •  CBC and differential; Future  •  Comprehensive metabolic panel; Future  •  Lipid panel; Future  •  Hemoglobin A1C; Future  •  TSH, 3rd generation with Free T4 reflex; Future  •  Vitamin D 25 hydroxy; Future    Rash  Recommend vaseline and hydroxyzine, image in chart follow up with derm. Have labs will call with results  Orders:  •  Ambulatory Referral to Dermatology; Future  •  [place] Allergy Panel, Adult; Future  •  Food Allergy Profile; Future  •  Celiac Panel/Adult; Future  •  hydrOXYzine HCL (ATARAX) 10 mg tablet; Take 1 tablet (10 mg total) by mouth every 6 (six) hours as needed for itching    Arthralgia, unspecified joint  Have labs, recommend tylenol arthritis.  Orders:  •  ROSARIO Screen w/Reflex Cascade; Future    Screening mammogram, encounter for  Have mammo  Orders:  •  Mammo screening bilateral w 3d and cad; Future    Screen for colon cancer  Have colonoscopy  Orders:  •  Ambulatory Referral to Gastroenterology; Future    Apnea  Discussed apnea, snoring and fatigue, will do sleep study and call with results   Orders:  •  Ambulatory Referral to Sleep Medicine; Future    Snoring  Discussed apnea, snoring and fatigue, will do sleep study and call with results  Orders:  •   "Ambulatory Referral to Sleep Medicine; Future    Palpitation  Stress test ordered for palps and sob with exercise, will call with results   Orders:  •  Stress test only, exercise; Future    Short of breath on exertion  Stress test ordered for palps and sob with exercise, will call with results   Orders:  •  Stress test only, exercise; Future           History of Present Illness   Paz is here to establish, she has been eating healthy and is active in her lifestyle and has not lost weight. She is having leg swelling and also knee pain.      Review of Systems   Constitutional:  Positive for diaphoresis. Negative for chills and fever.   HENT:  Positive for sinus pressure. Negative for congestion, ear pain, sinus pain and sore throat.    Respiratory:  Positive for chest tightness and shortness of breath. Negative for cough and wheezing.    Cardiovascular:  Positive for palpitations. Negative for chest pain.   Gastrointestinal:  Negative for abdominal pain, constipation, diarrhea, nausea and vomiting.   Genitourinary:  Negative for dysuria, frequency and hematuria.   Musculoskeletal:  Positive for arthralgias and myalgias.   Neurological:  Negative for dizziness, seizures, syncope, light-headedness and headaches.   Psychiatric/Behavioral:  Positive for sleep disturbance. Negative for dysphoric mood. The patient is not nervous/anxious.    All other systems reviewed and are negative.      Objective   /78   Pulse 84   Ht 5' 4\" (1.626 m)   Wt 134 kg (295 lb)   SpO2 98%   BMI 50.64 kg/m²      Physical Exam  Vitals and nursing note reviewed.   Constitutional:       General: She is not in acute distress.     Appearance: Normal appearance. She is well-developed. She is not ill-appearing.   HENT:      Head: Normocephalic and atraumatic.      Right Ear: Tympanic membrane, ear canal and external ear normal. There is no impacted cerumen.      Left Ear: Tympanic membrane, ear canal and external ear normal. There is no " impacted cerumen.      Nose: Nose normal. No congestion or rhinorrhea.      Mouth/Throat:      Mouth: Mucous membranes are moist.      Pharynx: No posterior oropharyngeal erythema.   Eyes:      Extraocular Movements: Extraocular movements intact.      Conjunctiva/sclera: Conjunctivae normal.      Pupils: Pupils are equal, round, and reactive to light.   Cardiovascular:      Rate and Rhythm: Normal rate and regular rhythm.      Pulses: Normal pulses.      Heart sounds: Normal heart sounds. No murmur heard.  Pulmonary:      Effort: Pulmonary effort is normal. No respiratory distress.      Breath sounds: Normal breath sounds.   Abdominal:      General: Bowel sounds are normal. There is no distension.      Palpations: Abdomen is soft.      Tenderness: There is no abdominal tenderness.   Musculoskeletal:         General: No swelling or tenderness. Normal range of motion.      Cervical back: Normal range of motion and neck supple. No tenderness.      Right lower leg: No edema.      Left lower leg: No edema.   Lymphadenopathy:      Cervical: No cervical adenopathy.   Skin:     General: Skin is warm and dry.      Capillary Refill: Capillary refill takes less than 2 seconds.   Neurological:      General: No focal deficit present.      Mental Status: She is alert and oriented to person, place, and time.   Psychiatric:         Mood and Affect: Mood normal.         Behavior: Behavior normal.         Thought Content: Thought content normal.         Judgment: Judgment normal.

## 2025-03-05 ENCOUNTER — TELEPHONE (OUTPATIENT)
Dept: ADMINISTRATIVE | Facility: OTHER | Age: 45
End: 2025-03-05

## 2025-03-05 NOTE — TELEPHONE ENCOUNTER
----- Message from Emilee SHEPARD sent at 3/4/2025  1:30 PM EST -----  03/04/25 1:30 PM    Hello, our patient No patient name on file. has had Pap Smear (HPV) aka Cervical Cancer Screening completed/performed. Please assist in updating the patient chart by pulling the document from encounter Tab within Chart Review. The date of service is 05/02/2022.     Thank you,  KHRIS Warren [facility] [address]

## 2025-03-06 ENCOUNTER — TRANSCRIBE ORDERS (OUTPATIENT)
Dept: SLEEP CENTER | Facility: CLINIC | Age: 45
End: 2025-03-06

## 2025-03-06 DIAGNOSIS — G47.33 OSA (OBSTRUCTIVE SLEEP APNEA): Primary | ICD-10-CM

## 2025-03-06 DIAGNOSIS — R06.83 SNORING: ICD-10-CM

## 2025-03-11 NOTE — TELEPHONE ENCOUNTER
Upon review of the In Basket request we were able to locate, review, and update the patient chart as requested for Pap Smear (HPV) aka Cervical Cancer Screening.    Any additional questions or concerns should be emailed to the Practice Liaisons via the appropriate education email address, please do not reply via In Basket.    Thank you  Tian Farah MA   [facility]